# Patient Record
Sex: FEMALE | Race: AMERICAN INDIAN OR ALASKA NATIVE
[De-identification: names, ages, dates, MRNs, and addresses within clinical notes are randomized per-mention and may not be internally consistent; named-entity substitution may affect disease eponyms.]

---

## 2018-02-23 ENCOUNTER — HOSPITAL ENCOUNTER (EMERGENCY)
Dept: HOSPITAL 5 - ED | Age: 48
Discharge: HOME | End: 2018-02-23
Payer: MEDICAID

## 2018-02-23 VITALS — SYSTOLIC BLOOD PRESSURE: 173 MMHG | DIASTOLIC BLOOD PRESSURE: 106 MMHG

## 2018-02-23 DIAGNOSIS — I10: ICD-10-CM

## 2018-02-23 DIAGNOSIS — G44.209: Primary | ICD-10-CM

## 2018-02-23 PROCEDURE — 70450 CT HEAD/BRAIN W/O DYE: CPT

## 2018-02-23 PROCEDURE — 82962 GLUCOSE BLOOD TEST: CPT

## 2018-02-23 NOTE — EMERGENCY DEPARTMENT REPORT
Blank Doc





- Documentation


Documentation: 





Patient is a 47-year-old American female no significant past history except for 

hypertension who is presenting with a global headache.  Patient states headache 

is been constant for approximately 1 week with some mild dizziness.  Patient 

states this aching pain 9 out of 10 in severity.  Patient's blood pressure has 

been elevated she's been out of her blood pressure medicines for the past 2 

months.  Patient denies any seizure activity syncope chest pain shortness of 

breath decreased urination


Patient will be given pain meds clonidine for blood pressure and head CT UB 

done to rule out any intraparenchymal bleed

## 2018-02-23 NOTE — CAT SCAN REPORT
FINAL REPORT



EXAM:  CT HEAD/BRAIN WO CON



HISTORY:  HA severe HTN 



TECHNIQUE:  CT of the head was performed.  No intravenous

contrast was administered.





PRIORS:  None.



FINDINGS:  

There is no evidence of intracranial hemorrhage.



There is no edema, mass effect or midline shift.



There are no abnormal extra-axial fluid collections.



The ventricles are appropriate for brain volume.



There is no skull fracture seen.



The visualized aspects of the sinuses are clear.



IMPRESSION:  

There is no acute intracranial abnormality identified.

## 2018-02-23 NOTE — EMERGENCY DEPARTMENT REPORT
ED Headache HPI





- General


Chief Complaint: Headache


Stated Complaint: MIGRAINE X 1 WEEK


Time Seen by Provider: 02/23/18 12:46





- History of Present Illness


Initial Comments: 





Patient is a 47-year-old female with a prior history of blood pressure was been 

out of her medication for the past 2 months presents to ED today complaining of 

headache.


She denies any trauma or injuries.,  Loss of consciousness, lightheadedness or 

dizziness or blurry vision


Allergies/Adverse Reactions: 


Allergies





No Known Allergies Allergy (Verified 02/23/18 12:11)


 








Home Medications: 


Ambulatory Orders





Amlodipine Besylate/Benazepril [Amlodipine-Benazepril 5-20 mg] 1 each PO DAILY #

30 capsule 06/17/16 


Nitrofurantoin Mono/M-Cryst [Macrobid CAP] 100 mg PO Q12HR #14 capsule 12/22/16 


Butalb/Acetaminophen/Caffeine [Fioricet -40 mg CAP] 1 cap PO Q8HR PRN #20 

cap 02/23/18 


amLODIPine [Norvasc] 5 mg PO DAILY #60 tab 02/23/18 











ED Review of Systems


ROS: 


Stated complaint: MIGRAINE X 1 WEEK


Other details as noted in HPI





Constitutional: denies: chills, fever


Eyes: denies: eye pain, eye discharge, vision change


ENT: denies: ear pain, throat pain


Respiratory: denies: cough, shortness of breath, wheezing


Cardiovascular: denies: chest pain, palpitations


Endocrine: no symptoms reported


Gastrointestinal: denies: abdominal pain, nausea, vomiting, diarrhea


Genitourinary: denies: urgency, dysuria, discharge


Musculoskeletal: denies: back pain, joint swelling, arthralgia


Skin: denies: rash, lesions


Neurological: headache.  denies: weakness, paresthesias, confusion, abnormal 

gait


Psychiatric: denies: anxiety, depression


Hematological/Lymphatic: denies: easy bleeding, easy bruising





ED Past Medical Hx





- Past Medical History


Hx Hypertension: Yes





- Surgical History


Past Surgical History?: No





- Social History


Smoking Status: Never Smoker


Substance Use Type: None





- Medications


Home Medications: 


 Home Medications











 Medication  Instructions  Recorded  Confirmed  Last Taken  Type


 


Amlodipine Besylate/Benazepril 1 each PO DAILY #30 capsule 06/17/16  Unknown Rx





[Amlodipine-Benazepril 5-20 mg]     


 


Nitrofurantoin Mono/M-Cryst 100 mg PO Q12HR #14 capsule 12/22/16  Unknown Rx





[Macrobid CAP]     


 


Butalb/Acetaminophen/Caffeine 1 cap PO Q8HR PRN #20 cap 02/23/18  Unknown Rx





[Fioricet -40 mg CAP]     


 


amLODIPine [Norvasc] 5 mg PO DAILY #60 tab 02/23/18  Unknown Rx














ED Physical Exam





- General


Limitations: No Limitations


General appearance: alert, in no apparent distress





- Head


Head exam: Present: atraumatic, normocephalic





- Eye


Eye exam: Present: normal appearance





- ENT


ENT exam: Present: mucous membranes moist





- Neck


Neck exam: Present: normal inspection





- Respiratory


Respiratory exam: Present: normal lung sounds bilaterally.  Absent: respiratory 

distress





- Cardiovascular


Cardiovascular Exam: Present: regular rate, normal rhythm.  Absent: systolic 

murmur, diastolic murmur, rubs, gallop





- GI/Abdominal


GI/Abdominal exam: Present: soft, normal bowel sounds





- Extremities Exam


Extremities exam: Present: normal inspection





- Back Exam


Back exam: Present: normal inspection





- Neurological Exam


Neurological exam: Present: alert, oriented X3, CN II-XII intact, normal gait, 

reflexes normal





- Expanded Neurological Exam


  ** Expanded


Patient oriented to: Present: person, place, time


Speech: Present: fluid speech


Cranial nerves: Facial Sensation: Normal


Cerebellar function: Finger to Nose: Normal


Sensory exam: Upper Extremity Light Touch: Normal, Lower Extremity Light Touch: 

Normal


Motor strength exam: RUE: 5, LUE: 5, RLE: 5, LLE: 5


DTR: knee (R): 2+, knee (L): 2+


Best Eye Response ([place]): (4) open spontaneously


Best Motor Response ([place]): (6) obeys commands


Best Verbal Response ([place]): (5) oriented


[place] Total: 15





- Psychiatric


Psychiatric exam: Present: normal affect, normal mood





- Skin


Skin exam: Present: warm, dry, intact, normal color.  Absent: rash





ED Course


 Vital Signs











  02/23/18 02/23/18 02/23/18





  12:12 13:10 13:11


 


Temperature 98 F  


 


Pulse Rate 75  175 H


 


Respiratory 18 16 





Rate   


 


Blood Pressure 202/125  202/125


 


Blood Pressure   





[Right]   


 


O2 Sat by Pulse 99  





Oximetry   














  02/23/18 02/23/18 02/23/18





  13:59 14:00 14:34


 


Temperature   


 


Pulse Rate 66  59 L


 


Respiratory 16 16 16





Rate   


 


Blood Pressure   173/106


 


Blood Pressure 181/116  





[Right]   


 


O2 Sat by Pulse 100  99





Oximetry   














ED Medical Decision Making





- Radiology Data


Radiology results: report reviewed, image reviewed





FINAL REPORT 





EXAM: CT HEAD/BRAIN WO CON 





HISTORY: HA severe HTN 





TECHNIQUE: CT of the head was performed. No intravenous 


contrast was administered. 








PRIORS: None. 





FINDINGS: 


There is no evidence of intracranial hemorrhage. 





There is no edema, mass effect or midline shift. 





There are no abnormal extra-axial fluid collections. 





The ventricles are appropriate for brain volume. 





There is no skull fracture seen. 





The visualized aspects of the sinuses are clear. 





IMPRESSION: 


There is no acute intracranial abnormality identified. 











Transcribed By: MARLINE 


Dictated By: MATTHIAS CASTILLO MD 


Electronically Authenticated By: MATTHIAS CASTILLO MD 


Signed Date/Time: 02/23/18 1000 








- Medical Decision Making





47-year-old female presents with a uncontrolled hypertension with associated 

headache.


ED course patient received pain medication and clonidine 0.2 ED.


CT scan ordered, CT scan of the head shows no acute findings.


Discussed this findings with the patient


Blood pressure monitored blood pressure reduced well prior to discharge.


However the patient's blood pressure medications are out.  I refilled the 

patient's blood pressure medication and gave pain medication for headache.


I discussed the patient's take medications daily and follow-up with the private 

care doctor.


I discussed the patient has sometimes when the pressure is not controlled cause 

headache.


Vital signs are stable, she is in no acute distress.  She reports feeling 

better.


She is speaking in fluid normal sentences with no neuro deficit.








Critical care attestation.: 


If time is entered above; I have spent that time in minutes in the direct care 

of this critically ill patient, excluding procedure time.








ED Disposition


Clinical Impression: 


 Uncontrolled hypertension





Headache


Qualifiers:


 Headache type: tension-type Headache chronicity pattern: acute headache 

Intractability: not intractable Qualified Code(s): G44.209 - Tension-type 

headache, unspecified, not intractable





Disposition: DC-01 TO HOME OR SELFCARE


Is pt being admited?: No


Does the pt Need Aspirin: No


Condition: Stable


Instructions:  Tension Headache (ED), Acute Headache (ED), Hypertension (ED)


Additional Instructions: 


Make sure to follow up with the primary care physician as discussed.


Take all your medications as you've been prescribed.


If you have any worsening symptoms or develop new symptoms please return to ED 

immediately.


Prescriptions: 


amLODIPine [Norvasc] 5 mg PO DAILY #60 tab


Butalb/Acetaminophen/Caffeine [Fioricet -40 mg CAP] 1 cap PO Q8HR PRN #20 

cap


 PRN Reason: Pain


Referrals: 


PRIMARY CARE,MD [Primary Care Provider] - 3-5 Days


LEMUEL HIDALGO MD [Referring] - 3-5 Days


[facility] [Outside] - 3-5 Days


[facility] [Outside] - 3-5 Days


MARGARITO REYES MD [Staff Physician] - 3-5 Days (follow up with neurology as 

reffered.If headache persists)


Forms:  Accompanied Note, Work/School Release Form(ED)


Time of Disposition: 14:12